# Patient Record
Sex: FEMALE | Race: OTHER
[De-identification: names, ages, dates, MRNs, and addresses within clinical notes are randomized per-mention and may not be internally consistent; named-entity substitution may affect disease eponyms.]

---

## 2017-01-11 ENCOUNTER — RECORD ABSTRACTING (OUTPATIENT)
Age: 52
End: 2017-01-11

## 2017-02-01 ENCOUNTER — APPOINTMENT (OUTPATIENT)
Dept: NUTRITION | Facility: CLINIC | Age: 52
End: 2017-02-01

## 2017-02-01 VITALS — BODY MASS INDEX: 32.72 KG/M2 | WEIGHT: 162 LBS

## 2017-03-14 ENCOUNTER — APPOINTMENT (OUTPATIENT)
Dept: INTERNAL MEDICINE | Facility: CLINIC | Age: 52
End: 2017-03-14

## 2017-05-10 ENCOUNTER — APPOINTMENT (OUTPATIENT)
Dept: NUTRITION | Facility: CLINIC | Age: 52
End: 2017-05-10

## 2017-05-10 VITALS — WEIGHT: 161 LBS | BODY MASS INDEX: 32.52 KG/M2

## 2017-05-24 ENCOUNTER — APPOINTMENT (OUTPATIENT)
Dept: INTERNAL MEDICINE | Facility: CLINIC | Age: 52
End: 2017-05-24

## 2017-05-24 VITALS
DIASTOLIC BLOOD PRESSURE: 83 MMHG | HEART RATE: 70 BPM | HEIGHT: 59 IN | WEIGHT: 160 LBS | BODY MASS INDEX: 32.25 KG/M2 | SYSTOLIC BLOOD PRESSURE: 118 MMHG

## 2017-05-24 DIAGNOSIS — G89.29 RIGHT UPPER QUADRANT PAIN: ICD-10-CM

## 2017-05-24 DIAGNOSIS — R10.11 RIGHT UPPER QUADRANT PAIN: ICD-10-CM

## 2017-05-24 DIAGNOSIS — R10.13 EPIGASTRIC PAIN: ICD-10-CM

## 2017-05-24 DIAGNOSIS — R73.09 OTHER ABNORMAL GLUCOSE: ICD-10-CM

## 2017-06-07 ENCOUNTER — OUTPATIENT (OUTPATIENT)
Dept: OUTPATIENT SERVICES | Facility: HOSPITAL | Age: 52
LOS: 1 days | Discharge: HOME | End: 2017-06-07

## 2017-06-28 DIAGNOSIS — Z12.31 ENCOUNTER FOR SCREENING MAMMOGRAM FOR MALIGNANT NEOPLASM OF BREAST: ICD-10-CM

## 2017-06-28 DIAGNOSIS — R92.2 INCONCLUSIVE MAMMOGRAM: ICD-10-CM

## 2017-06-29 ENCOUNTER — APPOINTMENT (OUTPATIENT)
Dept: BREAST CENTER | Facility: CLINIC | Age: 52
End: 2017-06-29

## 2017-06-29 VITALS
BODY MASS INDEX: 32.25 KG/M2 | SYSTOLIC BLOOD PRESSURE: 122 MMHG | HEIGHT: 59 IN | WEIGHT: 160 LBS | DIASTOLIC BLOOD PRESSURE: 78 MMHG

## 2017-08-09 ENCOUNTER — APPOINTMENT (OUTPATIENT)
Dept: NUTRITION | Facility: CLINIC | Age: 52
End: 2017-08-09

## 2017-11-28 ENCOUNTER — INPATIENT (INPATIENT)
Facility: HOSPITAL | Age: 52
LOS: 0 days | Discharge: HOME | End: 2017-11-29
Attending: EMERGENCY MEDICINE

## 2017-12-04 DIAGNOSIS — R74.8 ABNORMAL LEVELS OF OTHER SERUM ENZYMES: ICD-10-CM

## 2017-12-04 DIAGNOSIS — M54.2 CERVICALGIA: ICD-10-CM

## 2017-12-04 DIAGNOSIS — R05 COUGH: ICD-10-CM

## 2017-12-04 DIAGNOSIS — M25.512 PAIN IN LEFT SHOULDER: ICD-10-CM

## 2017-12-04 DIAGNOSIS — R07.89 OTHER CHEST PAIN: ICD-10-CM

## 2017-12-04 DIAGNOSIS — M54.89 OTHER DORSALGIA: ICD-10-CM

## 2017-12-04 DIAGNOSIS — R10.13 EPIGASTRIC PAIN: ICD-10-CM

## 2019-11-07 ENCOUNTER — APPOINTMENT (OUTPATIENT)
Dept: INTERNAL MEDICINE | Facility: CLINIC | Age: 54
End: 2019-11-07

## 2019-12-04 ENCOUNTER — OUTPATIENT (OUTPATIENT)
Dept: OUTPATIENT SERVICES | Facility: HOSPITAL | Age: 54
LOS: 1 days | Discharge: HOME | End: 2019-12-04

## 2019-12-04 ENCOUNTER — APPOINTMENT (OUTPATIENT)
Dept: INTERNAL MEDICINE | Facility: CLINIC | Age: 54
End: 2019-12-04
Payer: COMMERCIAL

## 2019-12-04 VITALS
BODY MASS INDEX: 33.26 KG/M2 | SYSTOLIC BLOOD PRESSURE: 138 MMHG | DIASTOLIC BLOOD PRESSURE: 84 MMHG | WEIGHT: 165 LBS | HEART RATE: 69 BPM | HEIGHT: 59 IN

## 2019-12-04 PROCEDURE — 99212 OFFICE O/P EST SF 10 MIN: CPT | Mod: GC

## 2019-12-04 NOTE — HISTORY OF PRESENT ILLNESS
[FreeTextEntry1] : pt presenting to clinic for f/u [de-identified] : 54 yo f h/o HLD, prediabetes, RICKETTS, gastritis, fibroadenoma of breast presents for follow up. pt  had mri guided breast bx of left breast found to have complex fibroadenoma. states worsening right upper quandrant right breast pain worse than before. has h/o 2 benign stable masses. otherwise no other complaints. Pt is also intrested in undergoing blood work , she has not f/u with breast surgeon for two years.\par

## 2019-12-04 NOTE — ASSESSMENT
[FreeTextEntry1] : 52 yo f h/o HLD, prediabetes, RICKETTS, gastritis, fibroadenoma of breast presents for follow up. pt  had mri guided breast bx of left breast found to have complex fibroadenoma. states worsening right upper quandrant right breast pain worse than before. has h/o 2 benign stable masses. otherwise no other complaints. Pt is also intrested in undergoing blood work , she has not f/u with breast surgeon for two years.\par \par 52 yo f h/o HLD, prediabetes, RICKETTS, gastritis, fibroadenoma of breast presents for follow up\par \par 1. prediabetes\par hba1c 6.0 two years ago , need to repeat blood work\par c/w diet and exercise\par nutritionist follow up\par \par \par 2. HLD\par repeat blood work up needed\par \par 3. Hx of RICKETTS\par - No f/u done by GI \par -start by checkinng LFTS\par \par 4. breast fibroadenoma/breast pain\par -two sister with breast ca\par -follow up with breast surgeon\par -check Diagnostic mammo and sono\par \par 5. HCM\par colonoscopy 12/2016- hemorrhoids. recommend f/u in 3 yrs\par need to repeat kelvin and pap\par

## 2019-12-04 NOTE — PHYSICAL EXAM
[Normal Sclera/Conjunctiva] : normal sclera/conjunctiva [No Acute Distress] : no acute distress [No Respiratory Distress] : no respiratory distress  [Normal Outer Ear/Nose] : the outer ears and nose were normal in appearance [No Carotid Bruits] : no carotid bruits [Soft] : abdomen soft

## 2019-12-11 DIAGNOSIS — K75.81 NONALCOHOLIC STEATOHEPATITIS (NASH): ICD-10-CM

## 2019-12-11 DIAGNOSIS — N60.12 DIFFUSE CYSTIC MASTOPATHY OF LEFT BREAST: ICD-10-CM

## 2019-12-11 DIAGNOSIS — Z00.00 ENCOUNTER FOR GENERAL ADULT MEDICAL EXAMINATION WITHOUT ABNORMAL FINDINGS: ICD-10-CM

## 2019-12-23 ENCOUNTER — FORM ENCOUNTER (OUTPATIENT)
Age: 54
End: 2019-12-23

## 2019-12-24 ENCOUNTER — LABORATORY RESULT (OUTPATIENT)
Age: 54
End: 2019-12-24

## 2019-12-24 ENCOUNTER — OUTPATIENT (OUTPATIENT)
Dept: OUTPATIENT SERVICES | Facility: HOSPITAL | Age: 54
LOS: 1 days | Discharge: HOME | End: 2019-12-24
Payer: OTHER GOVERNMENT

## 2019-12-24 DIAGNOSIS — N64.4 MASTODYNIA: ICD-10-CM

## 2019-12-24 PROCEDURE — 76642 ULTRASOUND BREAST LIMITED: CPT | Mod: 26,RT

## 2019-12-24 PROCEDURE — G0279: CPT | Mod: 26

## 2019-12-24 PROCEDURE — 77066 DX MAMMO INCL CAD BI: CPT | Mod: 26

## 2020-01-01 LAB
ALBUMIN SERPL ELPH-MCNC: 4.5 G/DL
ALP BLD-CCNC: 134 U/L
ALT SERPL-CCNC: 193 U/L
ANION GAP SERPL CALC-SCNC: 14 MMOL/L
AST SERPL-CCNC: 142 U/L
BASOPHILS # BLD AUTO: 0.04 K/UL
BASOPHILS NFR BLD AUTO: 0.8 %
BILIRUB SERPL-MCNC: 0.4 MG/DL
BUN SERPL-MCNC: 9 MG/DL
CALCIUM SERPL-MCNC: 9.4 MG/DL
CHLORIDE SERPL-SCNC: 101 MMOL/L
CHOLEST SERPL-MCNC: 183 MG/DL
CHOLEST/HDLC SERPL: 3.3 RATIO
CO2 SERPL-SCNC: 27 MMOL/L
CREAT SERPL-MCNC: 0.5 MG/DL
EOSINOPHIL # BLD AUTO: 0.12 K/UL
EOSINOPHIL NFR BLD AUTO: 2.5 %
ESTIMATED AVERAGE GLUCOSE: 123 MG/DL
GLUCOSE SERPL-MCNC: 97 MG/DL
HBA1C MFR BLD HPLC: 5.9 %
HCT VFR BLD CALC: 43.8 %
HDLC SERPL-MCNC: 55 MG/DL
HGB BLD-MCNC: 14.7 G/DL
IMM GRANULOCYTES NFR BLD AUTO: 0.2 %
LDLC SERPL CALC-MCNC: 125 MG/DL
LYMPHOCYTES # BLD AUTO: 1.82 K/UL
LYMPHOCYTES NFR BLD AUTO: 37.4 %
MAN DIFF?: NORMAL
MCHC RBC-ENTMCNC: 30.2 PG
MCHC RBC-ENTMCNC: 33.6 G/DL
MCV RBC AUTO: 89.9 FL
MONOCYTES # BLD AUTO: 0.38 K/UL
MONOCYTES NFR BLD AUTO: 7.8 %
NEUTROPHILS # BLD AUTO: 2.49 K/UL
NEUTROPHILS NFR BLD AUTO: 51.3 %
PLATELET # BLD AUTO: 233 K/UL
POTASSIUM SERPL-SCNC: 4.1 MMOL/L
PROT SERPL-MCNC: 7.6 G/DL
RBC # BLD: 4.87 M/UL
RBC # FLD: 12.7 %
SODIUM SERPL-SCNC: 142 MMOL/L
TRIGL SERPL-MCNC: 93 MG/DL
WBC # FLD AUTO: 4.86 K/UL

## 2020-01-15 ENCOUNTER — OUTPATIENT (OUTPATIENT)
Dept: OUTPATIENT SERVICES | Facility: HOSPITAL | Age: 55
LOS: 1 days | Discharge: HOME | End: 2020-01-15

## 2020-01-15 ENCOUNTER — APPOINTMENT (OUTPATIENT)
Dept: INTERNAL MEDICINE | Facility: CLINIC | Age: 55
End: 2020-01-15
Payer: COMMERCIAL

## 2020-01-15 VITALS
DIASTOLIC BLOOD PRESSURE: 77 MMHG | TEMPERATURE: 96.4 F | WEIGHT: 169 LBS | BODY MASS INDEX: 34.07 KG/M2 | SYSTOLIC BLOOD PRESSURE: 126 MMHG | HEART RATE: 67 BPM | HEIGHT: 59 IN

## 2020-01-15 DIAGNOSIS — R21 RASH AND OTHER NONSPECIFIC SKIN ERUPTION: ICD-10-CM

## 2020-01-15 PROCEDURE — 99213 OFFICE O/P EST LOW 20 MIN: CPT | Mod: GC

## 2020-01-15 NOTE — PHYSICAL EXAM
[No Acute Distress] : no acute distress [Well Nourished] : well nourished [Well Developed] : well developed [Normal Sclera/Conjunctiva] : normal sclera/conjunctiva [Well-Appearing] : well-appearing [PERRL] : pupils equal round and reactive to light [EOMI] : extraocular movements intact [Normal Oropharynx] : the oropharynx was normal [Normal Outer Ear/Nose] : the outer ears and nose were normal in appearance [No Lymphadenopathy] : no lymphadenopathy [No JVD] : no jugular venous distention [Supple] : supple [Thyroid Normal, No Nodules] : the thyroid was normal and there were no nodules present [No Accessory Muscle Use] : no accessory muscle use [No Respiratory Distress] : no respiratory distress  [Regular Rhythm] : with a regular rhythm [Normal Rate] : normal rate  [Clear to Auscultation] : lungs were clear to auscultation bilaterally [Normal S1, S2] : normal S1 and S2 [No Murmur] : no murmur heard [No Carotid Bruits] : no carotid bruits [No Abdominal Bruit] : a ~M bruit was not heard ~T in the abdomen [No Varicosities] : no varicosities [Pedal Pulses Present] : the pedal pulses are present [No Palpable Aorta] : no palpable aorta [No Edema] : there was no peripheral edema [Soft] : abdomen soft [No Extremity Clubbing/Cyanosis] : no extremity clubbing/cyanosis [Non Tender] : non-tender [Non-distended] : non-distended [No Masses] : no abdominal mass palpated [No HSM] : no HSM [Normal Bowel Sounds] : normal bowel sounds [Normal Posterior Cervical Nodes] : no posterior cervical lymphadenopathy [Normal Anterior Cervical Nodes] : no anterior cervical lymphadenopathy [No CVA Tenderness] : no CVA  tenderness [No Spinal Tenderness] : no spinal tenderness [Grossly Normal Strength/Tone] : grossly normal strength/tone [No Joint Swelling] : no joint swelling [No Rash] : no rash [Coordination Grossly Intact] : coordination grossly intact [No Focal Deficits] : no focal deficits [Normal Gait] : normal gait [Deep Tendon Reflexes (DTR)] : deep tendon reflexes were 2+ and symmetric [Normal Insight/Judgement] : insight and judgment were intact [Normal Affect] : the affect was normal

## 2020-01-17 DIAGNOSIS — M25.50 PAIN IN UNSPECIFIED JOINT: ICD-10-CM

## 2020-01-17 DIAGNOSIS — N64.4 MASTODYNIA: ICD-10-CM

## 2020-01-17 DIAGNOSIS — E78.5 HYPERLIPIDEMIA, UNSPECIFIED: ICD-10-CM

## 2020-01-17 DIAGNOSIS — R21 RASH AND OTHER NONSPECIFIC SKIN ERUPTION: ICD-10-CM

## 2020-01-17 DIAGNOSIS — K75.81 NONALCOHOLIC STEATOHEPATITIS (NASH): ICD-10-CM

## 2020-01-21 NOTE — HISTORY OF PRESENT ILLNESS
[FreeTextEntry1] : follow up [de-identified] : Patient here to follow up on blood test results.\par 55 yo f h/o HLD, prediabetes, RICKETTS, gastritis, complex fibroadenoma of breast presents for follow up.\par Last visit we recommended weight loss for RICKETTS, repeated mammogram for breast pain, diet control for preDM.\par Patient compliant with therapy and blood work. Found to have worsening LFTs, no belly pain, n/v/d.\par \par

## 2020-01-21 NOTE — ASSESSMENT
[FreeTextEntry1] : \par 1. prediabetes\par hba1c 5.9\par c/w diet and exercise\par \par 2. HLD\par resolved\par \par 3. Hx of RICKETTS\par - get GI eval \par - AST,ALT and ALP worse than previous values\par \par 4. breast fibroadenoma/breast pain\par -two sister with breast ca\par -follow up with breast surgeon\par - mammogram shows no suspicious lesions\par 5. HCM\par colonoscopy 12/2016- hemorrhoids. recommended f/u in 3 yrs, GI referral\par .

## 2020-01-30 ENCOUNTER — APPOINTMENT (OUTPATIENT)
Dept: BREAST CENTER | Facility: CLINIC | Age: 55
End: 2020-01-30

## 2020-02-11 LAB
ANA SER IF-ACNC: NEGATIVE
CERULOPLASMIN SERPL-MCNC: 27 MG/DL
HAV IGM SER QL: NONREACTIVE
HBV CORE IGM SER QL: NONREACTIVE
HBV SURFACE AG SER QL: NONREACTIVE
HCV AB SER QL: NONREACTIVE
HCV S/CO RATIO: 0.29 S/CO
MITOCHONDRIA AB SER IF-ACNC: NORMAL
SMOOTH MUSCLE AB SER QL IF: NORMAL

## 2020-02-17 ENCOUNTER — FORM ENCOUNTER (OUTPATIENT)
Age: 55
End: 2020-02-17

## 2020-02-18 ENCOUNTER — OUTPATIENT (OUTPATIENT)
Dept: OUTPATIENT SERVICES | Facility: HOSPITAL | Age: 55
LOS: 1 days | Discharge: HOME | End: 2020-02-18
Payer: SUBSIDIZED

## 2020-02-18 DIAGNOSIS — K75.81 NONALCOHOLIC STEATOHEPATITIS (NASH): ICD-10-CM

## 2020-02-18 PROCEDURE — 76700 US EXAM ABDOM COMPLETE: CPT | Mod: 26

## 2020-02-18 PROCEDURE — 76705 ECHO EXAM OF ABDOMEN: CPT | Mod: 26

## 2020-03-11 ENCOUNTER — APPOINTMENT (OUTPATIENT)
Dept: BREAST CENTER | Facility: CLINIC | Age: 55
End: 2020-03-11
Payer: COMMERCIAL

## 2020-03-11 VITALS
HEIGHT: 59 IN | WEIGHT: 169 LBS | SYSTOLIC BLOOD PRESSURE: 120 MMHG | DIASTOLIC BLOOD PRESSURE: 80 MMHG | BODY MASS INDEX: 34.07 KG/M2 | TEMPERATURE: 98.4 F

## 2020-03-11 DIAGNOSIS — N64.4 MASTODYNIA: ICD-10-CM

## 2020-03-11 PROCEDURE — 99213 OFFICE O/P EST LOW 20 MIN: CPT

## 2020-03-11 NOTE — PAST MEDICAL HISTORY
[FreeTextEntry5] : FELICE/BSO due to fibroids. [FreeTextEntry2] : no pregnancies. [FreeTextEntry6] : none [FreeTextEntry7] : none [FreeTextEntry8] : none

## 2020-03-11 NOTE — DATA REVIEWED
[FreeTextEntry1] : B/L Dx Mammo & Right Sono - 12/24/2019: \par Breast composition:The breasts are heterogeneously dense, which may obscure small masses. \par There are multiple stable benign nodules in both breasts. There are no suspicious masses, areas of architectural distortion or cluster of microcalcifications in either breast. The spot magnification views of the symptomatic areas show no suspicious findings. \par Targeted Right Breast Sonogram: \par In the region of the patient's focal pain at the 6:00 location 6 cm from the nipple, no solid/cystic lesions or areas of abnormal shadowing are seen. \par In the region of the patient's focal pain at the 10:00 location 4 cm from the nipple, there is a stable benign previously biopsied mass measuring 1.4 cm 1.3 cm x 0.9 cm. There is also a stable benign mass seen mammographically at the 10:00 location 6 cm from the nipple which is also in the region of the patient's pain measuring 0.9 cm x 0.7 cm x 0.3 cm. \par IMPRESSION:No mammographic or sonographic correlate for the reported area of focal pain at the 6:00 axis 6 cm from the nipple of the right breast. Clinical follow-up is recommended and further management of this patient should be based on the level of clinical concern. \par Two stable benign masses in the region of the patient's pain at the 10:00 axis as above. \par No evidence of malignancy.\par BI-RADS Category 2: Benign

## 2020-03-11 NOTE — ASSESSMENT
[FreeTextEntry1] : URIEL is a pierre 54 year old patient who presented today for follow up.\par She has a history of fibrocystic breast disease.  \par She has been doing well with complaints of right breast pain. \par Diagnostic imaging was done recently which revealed two stable benign masses in the right breast; no evidence of malignancy, as detailed above. \par Physical exam was unrevealing today.\par \par We discussed her breast pain she is experiencing and some methods to reduce this including: use of sports bra; reduction of caffeine intake; warm compress; and tylenol/NSAIDS if necessary.\par Imaging with a bilateral screening mammogram and sonogram will be due in December 2020, and that will be scheduled today. \par She will return for follow-up and clinical breast exam in December 2020.\par \par I spent a total of 15 minutes of face to face time with this patient, greater than 50% of which was spent in counseling and/or coordination of care.\par All of her questions were appropriately answered.\par She knows to call with any concerns.

## 2020-03-11 NOTE — HISTORY OF PRESENT ILLNESS
[FreeTextEntry1] : Patient with Left benign breast tissue with FC changes on Stereo 11/13/14; 6:00 posterior.  \par h/o Right benign breast bx 2012.\par FHx breast cancer - sister 43; father had stomach cancer.  No BRCA testing in family.\par \par Elif Model risk assessment: \par 2.2 % in five years \par 18.1 % in her lifetime\par \par URIEL FONTENOT is a 54 year old female patient who presents today for follow up.\par Since her last visit, she has complaints of right breast pain. \par Imaging with a bilateral diagnostic mammogram and right sonogram was done on 12/24/19, which revealed two stable benign masses in the right breast; no evidence of malignancy.\par \par She presents today for evaluation and imaging review.

## 2020-03-11 NOTE — PHYSICAL EXAM
[Normocephalic] : normocephalic [Atraumatic] : atraumatic [Examined in the supine and seated position] : examined in the supine and seated position [No dominant masses] : no dominant masses in right breast  [No dominant masses] : no dominant masses left breast [No Nipple Discharge] : no left nipple discharge [No Rashes] : no rashes [No Ulceration] : no ulceration [Breast Nipple Inversion] : nipples not inverted [Breast Nipple Retraction] : nipples not retracted [de-identified] : No axillary lymphadenopathy appreciated. [de-identified] : No axillary lymphadenopathy appreciated.

## 2020-03-11 NOTE — REVIEW OF SYSTEMS
[As Noted in HPI] : as noted in HPI [Breast Pain] : breast pain [Negative] : Constitutional [Breast Lump] : no breast lump [Nipple Discharge] : no nipple discharge [Nipple Inverted] : no inversion of the nipple

## 2020-05-13 ENCOUNTER — APPOINTMENT (OUTPATIENT)
Dept: INTERNAL MEDICINE | Facility: CLINIC | Age: 55
End: 2020-05-13

## 2020-12-14 ENCOUNTER — OUTPATIENT (OUTPATIENT)
Dept: OUTPATIENT SERVICES | Facility: HOSPITAL | Age: 55
LOS: 1 days | Discharge: HOME | End: 2020-12-14

## 2020-12-14 ENCOUNTER — APPOINTMENT (OUTPATIENT)
Dept: INTERNAL MEDICINE | Facility: CLINIC | Age: 55
End: 2020-12-14

## 2020-12-14 DIAGNOSIS — Z23 ENCOUNTER FOR IMMUNIZATION: ICD-10-CM

## 2021-02-03 ENCOUNTER — EMERGENCY (EMERGENCY)
Facility: HOSPITAL | Age: 56
LOS: 0 days | Discharge: HOME | End: 2021-02-04
Attending: EMERGENCY MEDICINE | Admitting: EMERGENCY MEDICINE
Payer: MEDICAID

## 2021-02-03 VITALS
RESPIRATION RATE: 18 BRPM | HEART RATE: 72 BPM | SYSTOLIC BLOOD PRESSURE: 179 MMHG | OXYGEN SATURATION: 97 % | DIASTOLIC BLOOD PRESSURE: 77 MMHG | TEMPERATURE: 98 F
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
## 2021-02-03 DIAGNOSIS — S80.211A ABRASION, RIGHT KNEE, INITIAL ENCOUNTER: ICD-10-CM
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
## 2021-02-03 DIAGNOSIS — S50.311A ABRASION OF RIGHT ELBOW, INITIAL ENCOUNTER: ICD-10-CM

## 2021-02-03 PROCEDURE — 99284 EMERGENCY DEPT VISIT MOD MDM: CPT

## 2021-02-03 RX ORDER — METHOCARBAMOL 500 MG/1
1500 TABLET, FILM COATED ORAL ONCE
Refills: 0 | Status: COMPLETED | OUTPATIENT
Start: 2021-02-03 | End: 2021-02-03

## 2021-02-03 RX ORDER — OXYCODONE HYDROCHLORIDE 5 MG/1
5 TABLET ORAL ONCE
Refills: 0 | Status: DISCONTINUED | OUTPATIENT
Start: 2021-02-03 | End: 2021-02-03

## 2021-02-03 RX ORDER — IBUPROFEN 200 MG
800 TABLET ORAL ONCE
Refills: 0 | Status: COMPLETED | OUTPATIENT
Start: 2021-02-03 | End: 2021-02-03

## 2021-02-03 RX ADMIN — OXYCODONE HYDROCHLORIDE 5 MILLIGRAM(S): 5 TABLET ORAL at 23:35

## 2021-02-03 RX ADMIN — METHOCARBAMOL 1500 MILLIGRAM(S): 500 TABLET, FILM COATED ORAL at 23:00

## 2021-02-03 RX ADMIN — Medication 800 MILLIGRAM(S): at 23:00

## 2021-02-04 PROCEDURE — 73590 X-RAY EXAM OF LOWER LEG: CPT | Mod: 26,RT

## 2021-02-04 PROCEDURE — 73080 X-RAY EXAM OF ELBOW: CPT | Mod: 26,RT

## 2021-02-04 PROCEDURE — 73564 X-RAY EXAM KNEE 4 OR MORE: CPT | Mod: 26,RT

## 2021-02-04 NOTE — ED PROVIDER NOTE - PHYSICAL EXAMINATION
PHYSICAL EXAM:    GENERAL: Alert, appears stated age, well appearing, non-toxic  SKIN: Warm, pink and dry. MMM.   HEAD: NC, AT, no step offs   EYE: Normal lids/conjunctiva  ENT: Normal hearing, patent oropharynx   NECK: +supple. No meningismus. no TTP.   Pulm: Bilateral BS, normal resp effort, no wheezes, stridor, or retractions  CV: RRR, no M/R/G, 2+and = radial pulses  Abd: soft, non-tender, non-distended, no TTP.    Mskel: no erythema, cyanosis, edema. no calf tenderness. +R knee diffuse TTP. 2+ DP pulses. +decreased ROM 2/2 pain. R elbow TTP with abrasin--although has full ROM. no other evidence of injury.   Neuro: AAOx3, no sensory/motor deficits,  No speech slurring, pronator drift, facial asymmetry. normal finger-to-nose b/l. 5/5 strength throughout. antalgic gait.

## 2021-02-04 NOTE — ED PROVIDER NOTE - CARE PROVIDER_API CALL
Mac Castillo (MD)  Orthopaedic Surgery  [address] [place] [address]  Phone: [phone]  Fax: [phone]  Follow Up Time: 1-3 Days

## 2021-02-04 NOTE — ED PROVIDER NOTE - NS ED ROS FT
Review of Systems    Constitutional: (-) fever   Eyes/ENT: (-) vision changes  Cardiovascular: (-) chest pain, (-) syncope (-) palpitations  Respiratory: (-) cough, (-) shortness of breath  Gastrointestinal: (-) vomiting, (-) diarrhea (-) abdominal pain  Musculoskeletal: (-) neck pain, (-) back pain  Integumentary: (-) rash, (-) edema  Neurological: (-) headache, (-) confusion  Hematologic: (-) easy bruising   Allergic/Immunologic: (-) pruritus

## 2021-02-04 NOTE — ED PROVIDER NOTE - PATIENT PORTAL LINK FT
You can access the FollowMyHealth Patient Portal offered by [facility] by registering at the following website: [email or web address]. By joining [email or web address] FollowMyHealth portal, you will also be able to view your health information using other applications (apps) compatible with our system.

## 2021-02-04 NOTE — ED PROVIDER NOTE - NSFOLLOWUPINSTRUCTIONS_ED_ALL_ED_FT
Fall Prevention in the Home  ImageFalls can cause injuries. They can happen to people of all ages. There are many things you can do to make your home safe and to help prevent falls.    What can I do on the outside of my home?  Regularly fix the edges of walkways and driveways and fix any cracks.  Remove anything that might make you trip as you walk through a door, such as a raised step or threshold.  Trim any bushes or trees on the path to your home.  Use bright outdoor lighting.  Clear any walking paths of anything that might make someone trip, such as rocks or tools.  Regularly check to see if handrails are loose or broken. Make sure that both sides of any steps have handrails.  Any raised decks and porches should have guardrails on the edges.  Have any leaves, snow, or ice cleared regularly.  Use sand or salt on walking paths during winter.  Clean up any spills in your garage right away. This includes oil or grease spills.  What can I do in the bathroom?  Use night lights.  Install grab bars by the toilet and in the tub and shower. Do not use towel bars as grab bars.  Use non-skid mats or decals in the tub or shower.  If you need to sit down in the shower, use a plastic, non-slip stool.  Keep the floor dry. Clean up any water that spills on the floor as soon as it happens.  Remove soap buildup in the tub or shower regularly.  Attach bath mats securely with double-sided non-slip rug tape.  Do not have throw rugs and other things on the floor that can make you trip.  What can I do in the bedroom?  Use night lights.  Make sure that you have a light by your bed that is easy to reach.  Do not use any sheets or blankets that are too big for your bed. They should not hang down onto the floor.  Have a firm chair that has side arms. You can use this for support while you get dressed.  Do not have throw rugs and other things on the floor that can make you trip.  What can I do in the kitchen?  Clean up any spills right away.  Avoid walking on wet floors.  Keep items that you use a lot in easy-to-reach places.  If you need to reach something above you, use a strong step stool that has a grab bar.  Keep electrical cords out of the way.  Do not use floor polish or wax that makes floors slippery. If you must use wax, use non-skid floor wax.  Do not have throw rugs and other things on the floor that can make you trip.  What can I do with my stairs?  Do not leave any items on the stairs.  Make sure that there are handrails on both sides of the stairs and use them. Fix handrails that are broken or loose. Make sure that handrails are as long as the stairways.  Check any carpeting to make sure that it is firmly attached to the stairs. Fix any carpet that is loose or worn.  Avoid having throw rugs at the top or bottom of the stairs. If you do have throw rugs, attach them to the floor with carpet tape.  Make sure that you have a light switch at the top of the stairs and the bottom of the stairs. If you do not have them, ask someone to add them for you.  What else can I do to help prevent falls?  Wear shoes that:    Do not have high heels.  Have rubber bottoms.  Are comfortable and fit you well.  Are closed at the toe. Do not wear sandals.    If you use a stepladder:    Make sure that it is fully opened. Do not climb a closed stepladder.  Make sure that both sides of the stepladder are locked into place.  Ask someone to hold it for you, if possible.    Clearly nano and make sure that you can see:    Any grab bars or handrails.  First and last steps.  Where the edge of each step is.    Use tools that help you move around (mobility aids) if they are needed. These include:    Canes.  Walkers.  Scooters.  Crutches.    Turn on the lights when you go into a dark area. Replace any light bulbs as soon as they burn out.  Set up your furniture so you have a clear path. Avoid moving your furniture around.  If any of your floors are uneven, fix them.  If there are any pets around you, be aware of where they are.  Review your medicines with your doctor. Some medicines can make you feel dizzy. This can increase your chance of falling.  Ask your doctor what other things that you can do to help prevent falls.    This information is not intended to replace advice given to you by your health care provider. Make sure you discuss any questions you have with your health care provider.     How to Use a Knee Immobilizer  A knee immobilizer, also called a knee brace, is used to support and protect an injured or painful knee. You may also have to wear it after knee surgery. A knee brace keeps your knee from moving or bending while it is healing. Wear and remove your knee brace only as told by your doctor.    ImageIn general, your knee brace should:    Have straps, hooks, or tapes that fasten snugly around your leg.  Not feel too tight or too loose.    Follow these instructions at home:  While resting, raise (elevate) your leg above the level of your heart. Pillows can be used for support. Doing this reduces throbbing and helps with healing.  Loosen the brace if your toes tingle or if you notice other symptoms or signs that it is too tight, such as:    Swelling.  Numbness.  Color change in your foot or ankle.  More pain.    Keep the brace clean.  If the brace is not waterproof:    Do not let it get wet.  Cover it with a watertight covering when you take a bath or a shower.    If your doctor says that you may take off (remove) the brace:    Take it off before you take a bath or a shower.  Check for any skin irritation.  Use a towel to dry the area completely before you put the brace back on.    Contact a doctor if:  Your knee brace breaks or needs to be replaced.  You have more pain or swelling in your knee, foot, or ankle.  Your knee brace is not helping.  Your knee brace makes your knee pain worse.  Summary  A knee immobilizer, also called a knee brace, keeps your knee from moving or bending while it is healing.  Different knee braces will have different instructions for use. Follow the instructions from your doctor.  Call your doctor if you have more pain or swelling, if your knee brace breaks, or if it does not help your knee pain.  This information is not intended to replace advice given to you by your health care provider. Make sure you discuss any questions you have with your health care provider.

## 2021-02-04 NOTE — ED PROVIDER NOTE - NSFOLLOWUPCLINICS_GEN_ALL_ED_FT
A Family Medicine Doctor  Family Medicine  .  [place]   Phone:   Fax:   Follow Up Time: 1-3 Days    [facility]  Orthpedic  [address], [place]   Phone: [phone]  Fax:   Follow Up Time: 1-3 Days

## 2021-02-04 NOTE — ED PROVIDER NOTE - CLINICAL SUMMARY MEDICAL DECISION MAKING FREE TEXT BOX
traumatic R knee/elbow pain - no obvious bony injuries on XR - knee brace/crutches applied, all results d/w pt & copies given, strict return precautions discussed, rec outpt ortho f/u

## 2021-02-04 NOTE — ED PROVIDER NOTE - PROGRESS NOTE DETAILS
PALEVY: ambulatory with crutches with steady gait. Reviewed all results and necessity for follow up. Counseled on red flags and to return for them.  Patient appears well on discharge.

## 2021-02-04 NOTE — ED PROVIDER NOTE - ATTENDING CONTRIBUTION TO CARE
55F no pmh p/w R knee & elbow pain s/p mechanical slip & fall on ice. No head trauma or loc. Sustained abrasions/bruising to R knee and R elbow. Able to bear weight but painful. No focal numbness or weakness.    PE:  nad  skin warm, dry  ncat  neck supple  rrr nl s1s2 no mrg  ctab no wrr  abd soft ntnd no palpable masses no rgr  back non-tender no cvat  ext- R knee +swelling, abrasion to lat aspect of joint, rom intact but limited 2/2 to pain, NVID; R elbow mild abrasion overlying olecranon, full rom/strength intact, no shoulder pain, NVID; lue/lle exam nl  neuro aaox3 grossly nf exam

## 2021-02-04 NOTE — ED PROVIDER NOTE - OBJECTIVE STATEMENT
56 y/o F with PMH without PMH presents with moderate constant throbbing non-radiating R knee pain x 630pm s/p mechanical slip and fall on ice @ 630pm. +worse with walking, better with rest.   also relates mild R elbow abrasion.   UTD on tetanus.   no head injury/LOC/N/V.

## 2021-02-10 ENCOUNTER — OUTPATIENT (OUTPATIENT)
Dept: OUTPATIENT SERVICES | Facility: HOSPITAL | Age: 56
LOS: 1 days | Discharge: HOME | End: 2021-02-10

## 2021-02-10 ENCOUNTER — APPOINTMENT (OUTPATIENT)
Dept: ORTHOPEDIC SURGERY | Facility: CLINIC | Age: 56
End: 2021-02-10

## 2021-05-18 ENCOUNTER — APPOINTMENT (OUTPATIENT)
Dept: INTERNAL MEDICINE | Facility: CLINIC | Age: 56
End: 2021-05-18

## 2021-07-06 ENCOUNTER — APPOINTMENT (OUTPATIENT)
Dept: INTERNAL MEDICINE | Facility: CLINIC | Age: 56
End: 2021-07-06

## 2021-09-14 ENCOUNTER — RESULT REVIEW (OUTPATIENT)
Age: 56
End: 2021-09-14

## 2021-09-14 ENCOUNTER — OUTPATIENT (OUTPATIENT)
Dept: OUTPATIENT SERVICES | Facility: HOSPITAL | Age: 56
LOS: 1 days | Discharge: HOME | End: 2021-09-14
Payer: MEDICAID

## 2021-09-14 DIAGNOSIS — N64.4 MASTODYNIA: ICD-10-CM

## 2021-09-14 PROCEDURE — G0279: CPT | Mod: 26

## 2021-09-14 PROCEDURE — 76641 ULTRASOUND BREAST COMPLETE: CPT | Mod: 26,50

## 2021-09-14 PROCEDURE — 77066 DX MAMMO INCL CAD BI: CPT | Mod: 26

## 2021-10-12 ENCOUNTER — APPOINTMENT (OUTPATIENT)
Dept: INTERNAL MEDICINE | Facility: CLINIC | Age: 56
End: 2021-10-12
Payer: COMMERCIAL

## 2021-10-12 ENCOUNTER — NON-APPOINTMENT (OUTPATIENT)
Age: 56
End: 2021-10-12

## 2021-10-12 ENCOUNTER — OUTPATIENT (OUTPATIENT)
Dept: OUTPATIENT SERVICES | Facility: HOSPITAL | Age: 56
LOS: 1 days | Discharge: HOME | End: 2021-10-12

## 2021-10-12 VITALS
OXYGEN SATURATION: 97 % | HEART RATE: 64 BPM | DIASTOLIC BLOOD PRESSURE: 79 MMHG | BODY MASS INDEX: 33.06 KG/M2 | TEMPERATURE: 97.5 F | HEIGHT: 59 IN | SYSTOLIC BLOOD PRESSURE: 134 MMHG | WEIGHT: 164 LBS

## 2021-10-12 DIAGNOSIS — Z87.39 PERSONAL HISTORY OF OTHER DISEASES OF THE MUSCULOSKELETAL SYSTEM AND CONNECTIVE TISSUE: ICD-10-CM

## 2021-10-12 DIAGNOSIS — R52 PAIN, UNSPECIFIED: ICD-10-CM

## 2021-10-12 PROCEDURE — 99213 OFFICE O/P EST LOW 20 MIN: CPT | Mod: GC

## 2021-10-12 RX ORDER — MELOXICAM 7.5 MG/1
7.5 TABLET ORAL
Qty: 14 | Refills: 2 | Status: ACTIVE | COMMUNITY
Start: 2020-01-15 | End: 1900-01-01

## 2021-10-12 NOTE — REVIEW OF SYSTEMS
[Joint Pain] : joint pain [Fever] : no fever [Chills] : no chills [Night Sweats] : no night sweats [Shortness Of Breath] : no shortness of breath [Wheezing] : no wheezing [Cough] : no cough [Abdominal Pain] : no abdominal pain [Nausea] : no nausea [Vomiting] : no vomiting [Joint Stiffness] : no joint stiffness [Muscle Pain] : no muscle pain

## 2021-10-12 NOTE — HISTORY OF PRESENT ILLNESS
[FreeTextEntry1] : Followup [de-identified] : this is a 56 year old female PMH of RICKETTS, prediabetes, HLD, breast masses who presents for followup. Mammo done last month which shows stable b/l breast masses, no evidence of malignancy.

## 2021-10-12 NOTE — PHYSICAL EXAM
[No Acute Distress] : no acute distress [No Respiratory Distress] : no respiratory distress  [No Accessory Muscle Use] : no accessory muscle use [Clear to Auscultation] : lungs were clear to auscultation bilaterally [Normal Rate] : normal rate  [Regular Rhythm] : with a regular rhythm [Normal S1, S2] : normal S1 and S2 [No Murmur] : no murmur heard [No Edema] : there was no peripheral edema [No Rash] : no rash [de-identified] : AAOX3

## 2021-10-12 NOTE — ASSESSMENT
[FreeTextEntry1] : this is a 56 year old female PMH of RICKETTS, prediabetes, HLD, breast masses who presents for followup\par \par Fibrocystic breast disease\par -mammogram last month shows stable breast masses, no signs of malignancy\par -will continue to f/u with surgical breast\par \par HLD\par -diet controlled\par -will continue to monitor\par \par # Pre-DM\par -diet controlled\par -f/u labs\par \par # Diffuse joint pain\par - knee joint b/l xrays ordered\par \par

## 2021-10-14 ENCOUNTER — NON-APPOINTMENT (OUTPATIENT)
Age: 56
End: 2021-10-14

## 2021-10-14 ENCOUNTER — APPOINTMENT (OUTPATIENT)
Dept: INTERNAL MEDICINE | Facility: CLINIC | Age: 56
End: 2021-10-14
Payer: COMMERCIAL

## 2021-10-14 ENCOUNTER — OUTPATIENT (OUTPATIENT)
Dept: OUTPATIENT SERVICES | Facility: HOSPITAL | Age: 56
LOS: 1 days | Discharge: HOME | End: 2021-10-14

## 2021-10-14 VITALS
OXYGEN SATURATION: 98 % | HEART RATE: 68 BPM | SYSTOLIC BLOOD PRESSURE: 152 MMHG | WEIGHT: 165 LBS | TEMPERATURE: 98 F | BODY MASS INDEX: 33.26 KG/M2 | DIASTOLIC BLOOD PRESSURE: 83 MMHG | HEIGHT: 59 IN

## 2021-10-14 DIAGNOSIS — Z01.419 ENCOUNTER FOR GYNECOLOGICAL EXAMINATION (GENERAL) (ROUTINE) W/OUT ABNORMAL FINDINGS: ICD-10-CM

## 2021-10-14 DIAGNOSIS — E55.9 VITAMIN D DEFICIENCY, UNSPECIFIED: ICD-10-CM

## 2021-10-14 DIAGNOSIS — M25.50 PAIN IN UNSPECIFIED JOINT: ICD-10-CM

## 2021-10-14 LAB
25(OH)D3 SERPL-MCNC: 33 NG/ML
ALBUMIN SERPL ELPH-MCNC: 4.2 G/DL
ALP BLD-CCNC: 126 U/L
ALT SERPL-CCNC: 147 U/L
ANION GAP SERPL CALC-SCNC: 14 MMOL/L
AST SERPL-CCNC: 100 U/L
BASOPHILS # BLD AUTO: 0.05 K/UL
BASOPHILS NFR BLD AUTO: 1.1 %
BILIRUB SERPL-MCNC: 0.4 MG/DL
BUN SERPL-MCNC: 12 MG/DL
CALCIUM SERPL-MCNC: 9.1 MG/DL
CHLORIDE SERPL-SCNC: 102 MMOL/L
CHOLEST SERPL-MCNC: 179 MG/DL
CO2 SERPL-SCNC: 26 MMOL/L
CREAT SERPL-MCNC: 0.5 MG/DL
EOSINOPHIL # BLD AUTO: 0.12 K/UL
EOSINOPHIL NFR BLD AUTO: 2.6 %
ERYTHROCYTE [SEDIMENTATION RATE] IN BLOOD BY WESTERGREN METHOD: 10 MM/HR
ESTIMATED AVERAGE GLUCOSE: 134 MG/DL
GLUCOSE SERPL-MCNC: 88 MG/DL
HBA1C MFR BLD HPLC: 6.3 %
HCT VFR BLD CALC: 43.5 %
HDLC SERPL-MCNC: 63 MG/DL
HGB BLD-MCNC: 14.4 G/DL
IMM GRANULOCYTES NFR BLD AUTO: 0.2 %
LDLC SERPL CALC-MCNC: 97 MG/DL
LYMPHOCYTES # BLD AUTO: 1.96 K/UL
LYMPHOCYTES NFR BLD AUTO: 42.1 %
MAN DIFF?: NORMAL
MCHC RBC-ENTMCNC: 30.6 PG
MCHC RBC-ENTMCNC: 33.1 G/DL
MCV RBC AUTO: 92.4 FL
MONOCYTES # BLD AUTO: 0.33 K/UL
MONOCYTES NFR BLD AUTO: 7.1 %
NEUTROPHILS # BLD AUTO: 2.19 K/UL
NEUTROPHILS NFR BLD AUTO: 46.9 %
NONHDLC SERPL-MCNC: 116 MG/DL
PLATELET # BLD AUTO: 220 K/UL
POTASSIUM SERPL-SCNC: 4.2 MMOL/L
PROT SERPL-MCNC: 7.4 G/DL
RBC # BLD: 4.71 M/UL
RBC # FLD: 12.8 %
RHEUMATOID FACT SER QL: <10 IU/ML
SODIUM SERPL-SCNC: 142 MMOL/L
TRIGL SERPL-MCNC: 103 MG/DL
TSH SERPL-ACNC: 2.35 UIU/ML
WBC # FLD AUTO: 4.66 K/UL

## 2021-10-14 PROCEDURE — 99213 OFFICE O/P EST LOW 20 MIN: CPT | Mod: GC

## 2021-10-14 RX ORDER — HYDROCORTISONE VALERATE 2 MG/G
0.2 CREAM TOPICAL
Qty: 1 | Refills: 2 | Status: DISCONTINUED | COMMUNITY
Start: 2020-01-15 | End: 2021-10-14

## 2021-10-15 DIAGNOSIS — B07.9 VIRAL WART, UNSPECIFIED: ICD-10-CM

## 2021-10-15 DIAGNOSIS — Z87.39 PERSONAL HISTORY OF OTHER DISEASES OF THE MUSCULOSKELETAL SYSTEM AND CONNECTIVE TISSUE: ICD-10-CM

## 2021-10-15 DIAGNOSIS — R52 PAIN, UNSPECIFIED: ICD-10-CM

## 2021-10-16 DIAGNOSIS — N64.9 DISORDER OF BREAST, UNSPECIFIED: ICD-10-CM

## 2021-10-16 DIAGNOSIS — Z01.419 ENCOUNTER FOR GYNECOLOGICAL EXAMINATION (GENERAL) (ROUTINE) WITHOUT ABNORMAL FINDINGS: ICD-10-CM

## 2021-10-16 DIAGNOSIS — M25.50 PAIN IN UNSPECIFIED JOINT: ICD-10-CM

## 2021-10-16 DIAGNOSIS — N60.12 DIFFUSE CYSTIC MASTOPATHY OF LEFT BREAST: ICD-10-CM

## 2021-10-16 DIAGNOSIS — E55.9 VITAMIN D DEFICIENCY, UNSPECIFIED: ICD-10-CM

## 2021-10-22 PROBLEM — M25.50 JOINT PAIN: Status: ACTIVE | Noted: 2020-01-15

## 2021-10-22 PROBLEM — Z01.419 WOMEN'S ANNUAL ROUTINE GYNECOLOGICAL EXAMINATION: Status: ACTIVE | Noted: 2021-10-14

## 2021-10-22 NOTE — ASSESSMENT
[FreeTextEntry1] : 56 year old female PMH of RICKETTS, prediabetes, HLD, breast masses who presents for follow-up. \par \par Fibrocystic breast disease\par -mammogram last month shows stable breast masses, no signs of malignancy\par -new R upper outer quadrant pain\par -ordered ultrasound and diagnostic mammogram of R outer upper quadrant \par \par #Stress incontinence \par -ordered pelvic ultrasound \par \par # Pre-DM\par -diet controlled\par -f/u labs from 10/12: A1C 6.3, estimated avg glucose 134\par -counselled on diet, exercise\par \par #RICKETTS\par - 10/12 , , improvement from Dec 2019\par - ordered liver sonogram \par - ordered hepatitis panel \par \par # HLD\par -diet controlled\par -will continue to monitor\par \par # Diffuse joint pain\par - knee XR from January reportedly showed arthritic changes \par - c/w meloxicam \par \par \par

## 2021-10-22 NOTE — PHYSICAL EXAM
[No Acute Distress] : no acute distress [No Respiratory Distress] : no respiratory distress  [No Accessory Muscle Use] : no accessory muscle use [Clear to Auscultation] : lungs were clear to auscultation bilaterally [Normal Rate] : normal rate  [Regular Rhythm] : with a regular rhythm [Normal S1, S2] : normal S1 and S2 [No Murmur] : no murmur heard [No Edema] : there was no peripheral edema [No Rash] : no rash [Declined Rectal Exam] : declined rectal exam [Normal] : affect was normal and insight and judgment were intact [Normal Appearance] : normal in appearance [No Nipple Discharge] : no nipple discharge [de-identified] : +right outer upper quadrant pain  [de-identified] : +stress incontinence  [de-identified] : +bilateral knee pain  [de-identified] : AAOX3

## 2021-10-22 NOTE — HISTORY OF PRESENT ILLNESS
[FreeTextEntry1] : Follow-up; pelvic exam  [de-identified] : 56 year old female PMH of RICKETTS, prediabetes, HLD, breast masses who presents for a follow-up visit and pelvic exam. Mammo done last month showed stable b/l breast masses, no evidence of malignancy. Today endorses right outer upper quadrant breast pain to palpation. Notably, 2 of the pt's younger sisters had breast cancer. Also complained of stress incontinence. Pt reports that she had a benign cyst for which a hysterectomy was performed (examination however revealed an intact cervix). \par Reports having had an XR in February for bilateral arthritic knee pain; currently takes Meloxicam as prescribed.

## 2021-10-22 NOTE — REVIEW OF SYSTEMS
[Joint Pain] : joint pain [Negative] : Heme/Lymph [Incontinence] : incontinence [Fever] : no fever [Chills] : no chills [Fatigue] : no fatigue [Hot Flashes] : no hot flashes [Night Sweats] : no night sweats [Recent Change In Weight] : ~T no recent weight change [Shortness Of Breath] : no shortness of breath [Wheezing] : no wheezing [Cough] : no cough [Abdominal Pain] : no abdominal pain [Nausea] : no nausea [Vomiting] : no vomiting [Joint Stiffness] : no joint stiffness [Muscle Pain] : no muscle pain [FreeTextEntry9] : B/l arthritic knee pain  [FreeTextEntry1] : +breast pain Right

## 2021-12-07 ENCOUNTER — NON-APPOINTMENT (OUTPATIENT)
Age: 56
End: 2021-12-07

## 2021-12-07 ENCOUNTER — OUTPATIENT (OUTPATIENT)
Dept: OUTPATIENT SERVICES | Facility: HOSPITAL | Age: 56
LOS: 1 days | Discharge: HOME | End: 2021-12-07

## 2021-12-07 ENCOUNTER — APPOINTMENT (OUTPATIENT)
Dept: DERMATOLOGY | Facility: CLINIC | Age: 56
End: 2021-12-07
Payer: COMMERCIAL

## 2021-12-07 VITALS
OXYGEN SATURATION: 98 % | WEIGHT: 168 LBS | BODY MASS INDEX: 33.87 KG/M2 | HEIGHT: 59 IN | SYSTOLIC BLOOD PRESSURE: 134 MMHG | TEMPERATURE: 97.5 F | HEART RATE: 67 BPM | DIASTOLIC BLOOD PRESSURE: 84 MMHG

## 2021-12-07 PROCEDURE — 99202 OFFICE O/P NEW SF 15 MIN: CPT | Mod: GC

## 2021-12-07 NOTE — ASSESSMENT
[FreeTextEntry1] : 56 year old female PMH of RICKETTS, prediabetes, HLD, breast masses who presents to derm clinic for multiple itchy red circular lesion on b/l hands.\par \par \par #Warts\par -RTC next week to freeze off with liquid nitrogen

## 2021-12-07 NOTE — HISTORY OF PRESENT ILLNESS
[de-identified] : 56 year old female PMH of RICKETTS, prediabetes, HLD, breast masses who presents to derm clinic for multiple itchy red circular lesion on b/l hands. Originally started with one lesion (which is the biggest) on her R 4th digit. States that she tried OTC fungal treatments with no relief and at times she shaves it down. No bleeding or pustular discharge.

## 2021-12-14 ENCOUNTER — OUTPATIENT (OUTPATIENT)
Dept: OUTPATIENT SERVICES | Facility: HOSPITAL | Age: 56
LOS: 1 days | Discharge: HOME | End: 2021-12-14

## 2021-12-14 ENCOUNTER — APPOINTMENT (OUTPATIENT)
Dept: DERMATOLOGY | Facility: CLINIC | Age: 56
End: 2021-12-14
Payer: SUBSIDIZED

## 2021-12-14 DIAGNOSIS — B07.9 VIRAL WART, UNSPECIFIED: ICD-10-CM

## 2021-12-14 PROCEDURE — 17110 DESTRUCTION B9 LES UP TO 14: CPT | Mod: GC

## 2021-12-14 NOTE — ASSESSMENT
[FreeTextEntry1] : 56 year old female, known to have RICKETTS, prediabetes, HLD and  breast masses, presented for freezing of warts lesions on her bilateral hands. \par \par #Warts\par - s/p Liquid Nitrogen application\par - Follow up in 1 month if residual wart is seen

## 2021-12-14 NOTE — HISTORY OF PRESENT ILLNESS
[de-identified] : 56 year old female, known to have RICKETTS, prediabetes, HLD and  breast masses, presented for freezing of warts lesions on her bilateral hands. \par She has a total of 5 lesions. Liquid nitrogen was applied to these areas during this session. \par

## 2021-12-14 NOTE — END OF VISIT
[] : Resident [FreeTextEntry3] : 5 warts on fingers and hands frozen with liquid nitrogen. 2 left, 3 right.\par Largest on right 4th finger over DIP joint dorsally, 6mm.\par Tolerated well

## 2021-12-14 NOTE — PHYSICAL EXAM
[Alert] : alert [Oriented x 3] : ~L oriented x 3 [Conjunctiva Non-injected] : conjunctiva non-injected [FreeTextEntry3] : 5 Warts on bilateral hands.

## 2021-12-30 LAB
ANA SER IF-ACNC: NEGATIVE
CYTOLOGY CVX/VAG DOC THIN PREP: ABNORMAL
HPV HIGH+LOW RISK DNA PNL CVX: NOT DETECTED

## 2022-01-11 ENCOUNTER — APPOINTMENT (OUTPATIENT)
Dept: DERMATOLOGY | Facility: CLINIC | Age: 57
End: 2022-01-11

## 2022-01-25 ENCOUNTER — APPOINTMENT (OUTPATIENT)
Dept: INTERNAL MEDICINE | Facility: CLINIC | Age: 57
End: 2022-01-25

## 2022-02-15 ENCOUNTER — OUTPATIENT (OUTPATIENT)
Dept: OUTPATIENT SERVICES | Facility: HOSPITAL | Age: 57
LOS: 1 days | Discharge: HOME | End: 2022-02-15
Payer: SUBSIDIZED

## 2022-02-15 DIAGNOSIS — M25.562 PAIN IN LEFT KNEE: ICD-10-CM

## 2022-02-15 DIAGNOSIS — M25.561 PAIN IN RIGHT KNEE: ICD-10-CM

## 2022-02-15 PROCEDURE — 73562 X-RAY EXAM OF KNEE 3: CPT | Mod: 26,50

## 2022-04-21 NOTE — ED PROCEDURE NOTE - PROCEDURE DATE TIME, MLM
Pt called requesting refills on (Eliquis) 5mg. Pt states he has noticed he's regaining fluid and does not have any medication. Please contact this pt as NP stated he was taken off on 3/5/22 while in hospital. Pt has a therapy appt @ [facility] today and would like to know if any one in cardiology can see him to discuss as it is important he restarts Lasix to prevent him from having to go to the hospital for fluid over load. Please contact this pt of . Thanks    
03-Feb-2021 22:53

## 2022-05-12 ENCOUNTER — APPOINTMENT (OUTPATIENT)
Dept: BREAST CENTER | Facility: CLINIC | Age: 57
End: 2022-05-12

## 2022-11-22 ENCOUNTER — NON-APPOINTMENT (OUTPATIENT)
Age: 57
End: 2022-11-22

## 2022-11-22 ENCOUNTER — APPOINTMENT (OUTPATIENT)
Dept: INTERNAL MEDICINE | Facility: CLINIC | Age: 57
End: 2022-11-22

## 2022-11-22 ENCOUNTER — OUTPATIENT (OUTPATIENT)
Dept: OUTPATIENT SERVICES | Facility: HOSPITAL | Age: 57
LOS: 1 days | Discharge: HOME | End: 2022-11-22

## 2022-11-22 VITALS
HEART RATE: 79 BPM | OXYGEN SATURATION: 97 % | WEIGHT: 162 LBS | TEMPERATURE: 96.6 F | SYSTOLIC BLOOD PRESSURE: 140 MMHG | BODY MASS INDEX: 32.66 KG/M2 | HEIGHT: 59 IN | DIASTOLIC BLOOD PRESSURE: 68 MMHG

## 2022-11-22 DIAGNOSIS — N64.9 DISORDER OF BREAST, UNSPECIFIED: ICD-10-CM

## 2022-11-22 PROCEDURE — 99213 OFFICE O/P EST LOW 20 MIN: CPT | Mod: GC

## 2022-11-30 DIAGNOSIS — R73.03 PREDIABETES: ICD-10-CM

## 2022-11-30 DIAGNOSIS — K75.81 NONALCOHOLIC STEATOHEPATITIS (NASH): ICD-10-CM

## 2022-11-30 DIAGNOSIS — N60.12 DIFFUSE CYSTIC MASTOPATHY OF LEFT BREAST: ICD-10-CM

## 2022-11-30 NOTE — HISTORY OF PRESENT ILLNESS
[FreeTextEntry1] : In for f/u chronic illnesses Scribe Attestation (For Scribes USE Only)... [de-identified] :     56 year old female PMH of RICKETTS, prediabetes, HLD, breast masses who presents for follow-up.\par \par -   Fibrocystic breast disease -mammogram last and breast abnormality No new mammogram this year.  No complaints feels well. Has not done any labs.  Does report following a diet\par  Attending Attestation (For Attendings USE Only).../Scribe Attestation (For Scribes USE Only)...

## 2022-11-30 NOTE — COUNSELING
[Cessation strategies including cessation program discussed] : Cessation strategies including cessation program discussed [Encouraged to pick a quit date and identify support needed to quit] : Encouraged to pick a quit date and identify support needed to quit

## 2022-11-30 NOTE — ASSESSMENT
[FreeTextEntry1] : \par \par 56 year old female PMH of RICKETTS, prediabetes, HLD, breast masses who presents for follow-up. \par \par # Sclerosing Adenosis & Fibrocystic breast disease\par - Dx has increase risk of breast cancer\par -mammogram last month shows stable breast masses, no signs of malignancy\par -new R upper outer quadrant pain\par - diagnostic mammogram of R outer upper quadrant and son 2021 normal\par - needs repeat this year\par \par #Stress incontinence \par -ordered pelvic ultrasound not done\par \par # Pre-DM\par -diet controlled\par -last (10/12/21): A1C 6.3,\par - high risk for DM, repeat A1C\par -counselled on diet, exercise\par \par #RICKETTS\par - 10/12/21 , , improvement from Dec 2019\par - ordered liver sonogram in 2021 not done, would first check current LFts\par - ordered hepatitis panel \par \par # HLD\par - on diet control only\par - check labs\par \par \par

## 2022-12-15 ENCOUNTER — APPOINTMENT (OUTPATIENT)
Dept: INTERNAL MEDICINE | Facility: CLINIC | Age: 57
End: 2022-12-15
Payer: SUBSIDIZED

## 2022-12-15 ENCOUNTER — OUTPATIENT (OUTPATIENT)
Dept: OUTPATIENT SERVICES | Facility: HOSPITAL | Age: 57
LOS: 1 days | Discharge: HOME | End: 2022-12-15

## 2022-12-15 VITALS
HEIGHT: 59 IN | OXYGEN SATURATION: 99 % | TEMPERATURE: 97 F | BODY MASS INDEX: 33.06 KG/M2 | WEIGHT: 164 LBS | DIASTOLIC BLOOD PRESSURE: 76 MMHG | HEART RATE: 72 BPM | SYSTOLIC BLOOD PRESSURE: 139 MMHG

## 2022-12-15 DIAGNOSIS — N60.11 DIFFUSE CYSTIC MASTOPATHY OF LEFT BREAST: ICD-10-CM

## 2022-12-15 DIAGNOSIS — K75.81 NONALCOHOLIC STEATOHEPATITIS (NASH): ICD-10-CM

## 2022-12-15 DIAGNOSIS — N60.12 DIFFUSE CYSTIC MASTOPATHY OF LEFT BREAST: ICD-10-CM

## 2022-12-15 DIAGNOSIS — E78.5 HYPERLIPIDEMIA, UNSPECIFIED: ICD-10-CM

## 2022-12-15 DIAGNOSIS — R73.03 PREDIABETES: ICD-10-CM

## 2022-12-15 LAB
25(OH)D3 SERPL-MCNC: 29 NG/ML
ALBUMIN SERPL ELPH-MCNC: 4.6 G/DL
ALP BLD-CCNC: 157 U/L
ALT SERPL-CCNC: 108 U/L
ANION GAP SERPL CALC-SCNC: 16 MMOL/L
AST SERPL-CCNC: 90 U/L
BASOPHILS # BLD AUTO: 0.05 K/UL
BASOPHILS NFR BLD AUTO: 1.1 %
BILIRUB SERPL-MCNC: 0.4 MG/DL
BUN SERPL-MCNC: 13 MG/DL
CALCIUM SERPL-MCNC: 9.6 MG/DL
CHLORIDE SERPL-SCNC: 102 MMOL/L
CHOLEST SERPL-MCNC: 189 MG/DL
CO2 SERPL-SCNC: 23 MMOL/L
CREAT SERPL-MCNC: 0.6 MG/DL
EGFR: 105 ML/MIN/1.73M2
EOSINOPHIL # BLD AUTO: 0.21 K/UL
EOSINOPHIL NFR BLD AUTO: 4.4 %
ESTIMATED AVERAGE GLUCOSE: 120 MG/DL
GLUCOSE SERPL-MCNC: 87 MG/DL
HBA1C MFR BLD HPLC: 5.8 %
HCT VFR BLD CALC: 43 %
HDLC SERPL-MCNC: 61 MG/DL
HGB BLD-MCNC: 14.5 G/DL
IMM GRANULOCYTES NFR BLD AUTO: 0.2 %
LDLC SERPL CALC-MCNC: 110 MG/DL
LYMPHOCYTES # BLD AUTO: 1.74 K/UL
LYMPHOCYTES NFR BLD AUTO: 36.8 %
MAN DIFF?: NORMAL
MCHC RBC-ENTMCNC: 30.1 PG
MCHC RBC-ENTMCNC: 33.7 G/DL
MCV RBC AUTO: 89.2 FL
MONOCYTES # BLD AUTO: 0.34 K/UL
MONOCYTES NFR BLD AUTO: 7.2 %
NEUTROPHILS # BLD AUTO: 2.38 K/UL
NEUTROPHILS NFR BLD AUTO: 50.3 %
NONHDLC SERPL-MCNC: 128 MG/DL
PLATELET # BLD AUTO: 210 K/UL
POTASSIUM SERPL-SCNC: 4.4 MMOL/L
PROT SERPL-MCNC: 7.7 G/DL
RBC # BLD: 4.82 M/UL
RBC # FLD: 13 %
SODIUM SERPL-SCNC: 141 MMOL/L
TRIGL SERPL-MCNC: 91 MG/DL
TSH SERPL-ACNC: 2.12 UIU/ML
WBC # FLD AUTO: 4.73 K/UL

## 2022-12-15 PROCEDURE — 99214 OFFICE O/P EST MOD 30 MIN: CPT | Mod: GC

## 2022-12-15 RX ORDER — CHOLECALCIFEROL (VITAMIN D3) 1250 MCG
1.25 MG CAPSULE ORAL
Qty: 6 | Refills: 0 | Status: ACTIVE | COMMUNITY
Start: 2021-10-14 | End: 1900-01-01

## 2022-12-15 RX ORDER — PANTOPRAZOLE 40 MG/1
40 TABLET, DELAYED RELEASE ORAL
Qty: 30 | Refills: 2 | Status: ACTIVE | COMMUNITY
Start: 2022-12-15 | End: 1900-01-01

## 2023-01-01 PROBLEM — N60.12 BILATERAL FIBROCYSTIC BREAST DISEASE (FCBD): Status: ACTIVE | Noted: 2017-06-28

## 2023-01-01 NOTE — ASSESSMENT
[FreeTextEntry1] : \par \par 56 year old female PMH of RICKETTS, prediabetes, HLD, breast masses who presents for follow-up. \par \par # Sclerosing Adenosis & Fibrocystic breast disease\par - Dx has increase risk of breast cancer\par -mammogram last month shows stable breast masses, no signs of malignancy\par -new R upper outer quadrant pain\par - diagnostic mammogram of R outer upper quadrant and son 2021 normal\par - needs repeat this year\par \par # Pre-DM\par -diet controlled\par -last 12/2022, 5.8\par -counselled on diet, exercise\par \par #RICKETTS\par #Abdominal pain\par - transaminitis improving; abdominal US ordered, hepatology referral\par - ordered JED, smooth muscle ab, ceruloplasmin, ESR\par - rx protonix 40mg daily \par \par \par # HLD\par - on diet control only\par - 12/2022 \par \par HCM\par -mammo/breast US last year, BIRADS 2\par - pap last year showed no malignant cells, HPV testing negative\par - flu vaccine administered today\par \par

## 2023-01-01 NOTE — REVIEW OF SYSTEMS
[Abdominal Pain] : abdominal pain [Fever] : no fever [Chills] : no chills [Night Sweats] : no night sweats [Chest Pain] : no chest pain [Palpitations] : no palpitations [Orthopnea] : no orthopnea [Shortness Of Breath] : no shortness of breath [Wheezing] : no wheezing [Cough] : no cough [Nausea] : no nausea [Vomiting] : no vomiting [Dysuria] : no dysuria [Incontinence] : no incontinence [Hematuria] : no hematuria

## 2023-01-01 NOTE — PHYSICAL EXAM
[No Acute Distress] : no acute distress [Well Nourished] : well nourished [Well Developed] : well developed [Well-Appearing] : well-appearing [Normal Sclera/Conjunctiva] : normal sclera/conjunctiva [PERRL] : pupils equal round and reactive to light [EOMI] : extraocular movements intact [No Respiratory Distress] : no respiratory distress  [No Accessory Muscle Use] : no accessory muscle use [Clear to Auscultation] : lungs were clear to auscultation bilaterally [Normal Rate] : normal rate  [Regular Rhythm] : with a regular rhythm [Normal S1, S2] : normal S1 and S2 [No Murmur] : no murmur heard [Pedal Pulses Present] : the pedal pulses are present [No Edema] : there was no peripheral edema [Non-distended] : non-distended [Normal Affect] : the affect was normal [Normal Insight/Judgement] : insight and judgment were intact [de-identified] : RUQ tenderness to palpation

## 2023-01-01 NOTE — HISTORY OF PRESENT ILLNESS
[FreeTextEntry1] : followup [de-identified] :     57 year old female PMH of RICKETTS, prediabetes, HLD, breast masses who presents for follow-up. Pt with hx of RICKETTS, labs show improvement in transaminitis, has not had RUQ US done yet. Still complaining of postprandial RUQ pain.  , and A1C improving, last 5.8.   Fibrocystic breast disease -mammogram last and breast abnormality No new mammogram this year.   Does report following a diet\par

## 2023-02-13 ENCOUNTER — RESULT REVIEW (OUTPATIENT)
Age: 58
End: 2023-02-13

## 2023-02-13 ENCOUNTER — OUTPATIENT (OUTPATIENT)
Dept: OUTPATIENT SERVICES | Facility: HOSPITAL | Age: 58
LOS: 1 days | End: 2023-02-13
Payer: MEDICAID

## 2023-02-13 DIAGNOSIS — Z00.8 ENCOUNTER FOR OTHER GENERAL EXAMINATION: ICD-10-CM

## 2023-02-13 DIAGNOSIS — N64.4 MASTODYNIA: ICD-10-CM

## 2023-02-13 PROCEDURE — 77066 DX MAMMO INCL CAD BI: CPT

## 2023-02-13 PROCEDURE — 77066 DX MAMMO INCL CAD BI: CPT | Mod: 26

## 2023-02-13 PROCEDURE — 76642 ULTRASOUND BREAST LIMITED: CPT | Mod: 26,RT

## 2023-02-13 PROCEDURE — G0279: CPT | Mod: 26

## 2023-02-13 PROCEDURE — 76642 ULTRASOUND BREAST LIMITED: CPT | Mod: RT

## 2023-02-13 PROCEDURE — G0279: CPT

## 2023-04-03 ENCOUNTER — OUTPATIENT (OUTPATIENT)
Dept: OUTPATIENT SERVICES | Facility: HOSPITAL | Age: 58
LOS: 1 days | End: 2023-04-03
Payer: COMMERCIAL

## 2023-04-03 ENCOUNTER — APPOINTMENT (OUTPATIENT)
Age: 58
End: 2023-04-03
Payer: COMMERCIAL

## 2023-04-03 VITALS
RESPIRATION RATE: 16 BRPM | OXYGEN SATURATION: 96 % | HEART RATE: 82 BPM | TEMPERATURE: 98.1 F | DIASTOLIC BLOOD PRESSURE: 79 MMHG | SYSTOLIC BLOOD PRESSURE: 124 MMHG | HEIGHT: 59 IN

## 2023-04-03 DIAGNOSIS — Z00.00 ENCOUNTER FOR GENERAL ADULT MEDICAL EXAMINATION WITHOUT ABNORMAL FINDINGS: ICD-10-CM

## 2023-04-03 PROCEDURE — 99204 OFFICE O/P NEW MOD 45 MIN: CPT

## 2023-04-03 PROCEDURE — 99204 OFFICE O/P NEW MOD 45 MIN: CPT | Mod: GC

## 2023-04-03 NOTE — ASSESSMENT
[FreeTextEntry1] : 57 year old female with hx of elevated LFTs likely due to RICKETTS, prediabetes, presenting for initial evaluation of chronic abnormal LFTs. Patient has had elevated LFTs since 2017 but never seen a liver specialist. she feels fine with only RUQ pain intermittent and can be related to food. she did have cholecystectomy years ago. no other symptoms. no gianni loss. no previous blood transfusion. no needlestick. nonsmoker, no alcohol, no drugs, no herbal meds, no meds except tylenol as needed. no FH of liver cancer or disease. \par \par # Elevated LFTs - chronic\par - Patient with chronic elevated LFTs in mixed pattern since 6 years\par - previous CLD workup in 2020 incomplete and negative\par - no FHx of liver disease. no previous blood transfusion, injections, herbal meds usage\par - uses tylenol PRN\par - RUQ pain (hx of cholecystectomy)\par \par plan\par - check all CLD workup ( alpha 1 antitrypsin, hep panel, hiv, immunoglobulins, smooth muscle, iron studies, cerulopasmin, microsomal ab, mitochondrial ab, inr, repeat cmp)\par - check RUQ sono\par - do fibroscan\par - f/u in 2 months\par

## 2023-04-03 NOTE — PHYSICAL EXAM
[General Appearance - Alert] : alert [Sclera] : the sclera and conjunctiva were normal [Hearing Threshold Finger Rub Not Lamar] : hearing was normal [Neck Appearance] : the appearance of the neck was normal [Heart Sounds] : normal S1 and S2 [Abdomen Soft] : soft [No CVA Tenderness] : no ~M costovertebral angle tenderness [Abnormal Walk] : normal gait [] : no rash [FreeTextEntry1] : RUQ tenderness

## 2023-04-03 NOTE — END OF VISIT
[] : Resident [FreeTextEntry3] : 57 y o  F with elevated liver tests since 2016.\par BMI 33 Prediabetic, Chronic RUQ pain post cholecystectomy\par CLD work up including rept US abdomen [Time Spent: ___ minutes] : I have spent [unfilled] minutes of time on the encounter.

## 2023-04-03 NOTE — HISTORY OF PRESENT ILLNESS
[de-identified] : 57 year old female with hx of elevated LFTs likely due to RICKETTS, prediabetes, presenting for initial evaluation of chronic abnormal LFTs. Patient has had elevated LFTs since 2017 but never seen a liver specialist. she feels fine with only RUQ pain intermittent and can be related to food. she did have cholecystectomy years ago. no other symptoms. no gianni loss. no previous blood transfusion. no needlestick. nonsmoker, no alcohol, no drugs, no herbal meds, no meds except Tylenol as needed. no FH of liver cancer or disease.

## 2023-04-11 ENCOUNTER — NON-APPOINTMENT (OUTPATIENT)
Age: 58
End: 2023-04-11

## 2023-04-11 ENCOUNTER — APPOINTMENT (OUTPATIENT)
Dept: INTERNAL MEDICINE | Facility: CLINIC | Age: 58
End: 2023-04-11

## 2023-04-11 ENCOUNTER — APPOINTMENT (OUTPATIENT)
Dept: INTERNAL MEDICINE | Facility: CLINIC | Age: 58
End: 2023-04-11
Payer: COMMERCIAL

## 2023-04-11 ENCOUNTER — OUTPATIENT (OUTPATIENT)
Dept: OUTPATIENT SERVICES | Facility: HOSPITAL | Age: 58
LOS: 1 days | End: 2023-04-11
Payer: COMMERCIAL

## 2023-04-11 VITALS
HEART RATE: 83 BPM | BODY MASS INDEX: 32.66 KG/M2 | DIASTOLIC BLOOD PRESSURE: 79 MMHG | WEIGHT: 162 LBS | HEIGHT: 59 IN | SYSTOLIC BLOOD PRESSURE: 138 MMHG | OXYGEN SATURATION: 98 % | TEMPERATURE: 97.3 F

## 2023-04-11 DIAGNOSIS — E78.5 HYPERLIPIDEMIA, UNSPECIFIED: ICD-10-CM

## 2023-04-11 DIAGNOSIS — Z00.00 ENCOUNTER FOR GENERAL ADULT MEDICAL EXAMINATION WITHOUT ABNORMAL FINDINGS: ICD-10-CM

## 2023-04-11 DIAGNOSIS — R73.03 PREDIABETES.: ICD-10-CM

## 2023-04-11 DIAGNOSIS — R74.8 ABNORMAL LEVELS OF OTHER SERUM ENZYMES: ICD-10-CM

## 2023-04-11 DIAGNOSIS — K29.50 UNSPECIFIED CHRONIC GASTRITIS W/OUT BLEEDING: ICD-10-CM

## 2023-04-11 DIAGNOSIS — H53.8 OTHER VISUAL DISTURBANCES: ICD-10-CM

## 2023-04-11 DIAGNOSIS — B07.9 VIRAL WART, UNSPECIFIED: ICD-10-CM

## 2023-04-11 DIAGNOSIS — Z00.00 ENCOUNTER FOR GENERAL ADULT MEDICAL EXAMINATION W/OUT ABNORMAL FINDINGS: ICD-10-CM

## 2023-04-11 PROCEDURE — 99213 OFFICE O/P EST LOW 20 MIN: CPT

## 2023-04-11 PROCEDURE — 99213 OFFICE O/P EST LOW 20 MIN: CPT | Mod: GC

## 2023-04-11 RX ORDER — PANTOPRAZOLE 40 MG/1
40 TABLET, DELAYED RELEASE ORAL
Qty: 30 | Refills: 2 | Status: ACTIVE | COMMUNITY
Start: 2023-04-11 | End: 1900-01-01

## 2023-04-11 NOTE — PHYSICAL EXAM
[No Acute Distress] : no acute distress [Normal Sclera/Conjunctiva] : normal sclera/conjunctiva [EOMI] : extraocular movements intact [Normal Outer Ear/Nose] : the outer ears and nose were normal in appearance [Normal Oropharynx] : the oropharynx was normal [Supple] : supple [No Respiratory Distress] : no respiratory distress  [Clear to Auscultation] : lungs were clear to auscultation bilaterally [Regular Rhythm] : with a regular rhythm [Normal S1, S2] : normal S1 and S2 [No Edema] : there was no peripheral edema [No CVA Tenderness] : no CVA  tenderness [No Joint Swelling] : no joint swelling [Grossly Normal Strength/Tone] : grossly normal strength/tone [No Rash] : no rash [No Focal Deficits] : no focal deficits [Alert and Oriented x3] : oriented to person, place, and time

## 2023-04-12 DIAGNOSIS — Z00.00 ENCOUNTER FOR GENERAL ADULT MEDICAL EXAMINATION WITHOUT ABNORMAL FINDINGS: ICD-10-CM

## 2023-04-12 DIAGNOSIS — R73.03 PREDIABETES: ICD-10-CM

## 2023-04-12 DIAGNOSIS — R74.8 ABNORMAL LEVELS OF OTHER SERUM ENZYMES: ICD-10-CM

## 2023-04-12 DIAGNOSIS — K75.81 NONALCOHOLIC STEATOHEPATITIS (NASH): ICD-10-CM

## 2023-04-17 NOTE — HISTORY OF PRESENT ILLNESS
[FreeTextEntry1] : routine f/u [de-identified] : 57 year old female PMH of RICKETTS, prediabetes, HLD, breast masses who presents for follow-up. Today patient complaining of some knee pains, which she says is chronic and vision problems (blurriness, and having trouble w near vision); ROS otherwise negative.

## 2023-04-17 NOTE — ASSESSMENT
[FreeTextEntry1] : 57 year old female PMH of RICKETTS, prediabetes, HLD, breast masses who presents for follow-up.\par \par # Elevated LFTs - chronic (elevated LFTs in mixed pattern since 6 years)\par - previous CLD workup in 2020 incomplete and negative\par - no FHx of liver disease. no previous blood transfusion, injections, herbal meds usage\par - uses tylenol PRN\par - RUQ pain (hx of cholecystectomy)\par - followed w hepatology - last seen 4/3/2023 \par - check all CLD workup (alpha 1 antitrypsin, hep panel, hiv, immunoglobulins, smooth muscle neg, iron studies, cerulopasmin 25, microsomal ab, mitochondrial ab, inr, repeat cmp)\par - check RUQ sono And fibroscan\par \par #HLD - diet controlled\par - last chol 189, ldl 110\par \par #Pre-DM (last A1c 5.8)\par - diet and lifestyle modification\par \par #H/o sclerosing Adenosis & Fibrocystic breast disease - Dx has increase risk of breast cancer\par - last mammo 3/2/2023 w no signs of malignancy, BIRAD 1\par \par #H/o Gastritis - d/x on egd 9/2016\par - sent script for ppi\par - as well as gi referral for repeat c-scope\par \par #Blurry vision \par - ophtho referral\par \par #Warts\par - derm referral\par \par HCM:\par - MAMMO 3/2023, [facility]N 12/2021, COLO 9/2016 but poor prep - needs repeat\par - had COVID & FLU vaccines; unsure about TETANUS and never had SHINGLES ( sent to pharmacy)\par - RTC IN 6 MONTHS W REPEAT LABS\par \par

## 2023-04-19 DIAGNOSIS — E78.5 HYPERLIPIDEMIA, UNSPECIFIED: ICD-10-CM

## 2023-04-19 DIAGNOSIS — R73.03 PREDIABETES: ICD-10-CM

## 2023-04-19 DIAGNOSIS — H53.8 OTHER VISUAL DISTURBANCES: ICD-10-CM

## 2023-04-19 DIAGNOSIS — B07.9 VIRAL WART, UNSPECIFIED: ICD-10-CM

## 2023-05-04 ENCOUNTER — APPOINTMENT (OUTPATIENT)
Dept: GASTROENTEROLOGY | Facility: CLINIC | Age: 58
End: 2023-05-04
Payer: MEDICAID

## 2023-05-04 DIAGNOSIS — K75.81 NONALCOHOLIC STEATOHEPATITIS (NASH): ICD-10-CM

## 2023-05-04 PROCEDURE — 91200 LIVER ELASTOGRAPHY: CPT

## 2023-05-15 LAB
ANA PAT FLD IF-IMP: ABNORMAL
ANA SER IF-ACNC: ABNORMAL
CERULOPLASMIN SERPL-MCNC: 25 MG/DL
ERYTHROCYTE [SEDIMENTATION RATE] IN BLOOD BY WESTERGREN METHOD: 44 MM/HR
SMOOTH MUSCLE AB SER QL IF: NORMAL

## 2023-06-05 ENCOUNTER — APPOINTMENT (OUTPATIENT)
Age: 58
End: 2023-06-05

## 2023-07-05 ENCOUNTER — APPOINTMENT (OUTPATIENT)
Dept: OPHTHALMOLOGY | Facility: CLINIC | Age: 58
End: 2023-07-05
Payer: COMMERCIAL

## 2023-07-05 ENCOUNTER — OUTPATIENT (OUTPATIENT)
Dept: OUTPATIENT SERVICES | Facility: HOSPITAL | Age: 58
LOS: 1 days | End: 2023-07-05
Payer: COMMERCIAL

## 2023-07-05 DIAGNOSIS — D31.02 BENIGN NEOPLASM OF LEFT CONJUNCTIVA: ICD-10-CM

## 2023-07-05 DIAGNOSIS — H16.229 KERATOCONJUNCTIVITIS SICCA, NOT SPECIFIED AS SJOGREN'S, UNSPECIFIED EYE: ICD-10-CM

## 2023-07-05 DIAGNOSIS — H02.88A MEIBOMIAN GLAND DYSFUNCTION RIGHT EYE, UPPER AND LOWER EYELIDS: ICD-10-CM

## 2023-07-05 DIAGNOSIS — H53.8 OTHER VISUAL DISTURBANCES: ICD-10-CM

## 2023-07-05 PROCEDURE — 92002 INTRM OPH EXAM NEW PATIENT: CPT

## 2023-09-13 ENCOUNTER — APPOINTMENT (OUTPATIENT)
Dept: OPHTHALMOLOGY | Facility: CLINIC | Age: 58
End: 2023-09-13

## 2023-09-27 ENCOUNTER — APPOINTMENT (OUTPATIENT)
Dept: GASTROENTEROLOGY | Facility: CLINIC | Age: 58
End: 2023-09-27

## 2023-10-03 ENCOUNTER — APPOINTMENT (OUTPATIENT)
Dept: INTERNAL MEDICINE | Facility: CLINIC | Age: 58
End: 2023-10-03

## 2024-11-18 ENCOUNTER — OUTPATIENT (OUTPATIENT)
Dept: OUTPATIENT SERVICES | Facility: HOSPITAL | Age: 59
LOS: 1 days | End: 2024-11-18
Payer: COMMERCIAL

## 2024-11-18 DIAGNOSIS — Z12.31 ENCOUNTER FOR SCREENING MAMMOGRAM FOR MALIGNANT NEOPLASM OF BREAST: ICD-10-CM

## 2024-11-18 DIAGNOSIS — Z00.00 ENCOUNTER FOR GENERAL ADULT MEDICAL EXAMINATION WITHOUT ABNORMAL FINDINGS: ICD-10-CM

## 2024-11-18 PROCEDURE — 77063 BREAST TOMOSYNTHESIS BI: CPT | Mod: 26

## 2024-11-18 PROCEDURE — 77067 SCR MAMMO BI INCL CAD: CPT | Mod: 26

## 2024-11-18 PROCEDURE — 77067 SCR MAMMO BI INCL CAD: CPT

## 2024-11-18 PROCEDURE — 77063 BREAST TOMOSYNTHESIS BI: CPT

## 2024-11-19 DIAGNOSIS — Z12.31 ENCOUNTER FOR SCREENING MAMMOGRAM FOR MALIGNANT NEOPLASM OF BREAST: ICD-10-CM

## 2025-06-11 NOTE — ED PROVIDER NOTE - CARE PROVIDERS DIRECT ADDRESSES
Called patient, LVM to rs appointment with  on 6/27 provider will not be in office at that time.   
[email or web address]